# Patient Record
Sex: MALE | Race: WHITE | Employment: FULL TIME | URBAN - METROPOLITAN AREA
[De-identification: names, ages, dates, MRNs, and addresses within clinical notes are randomized per-mention and may not be internally consistent; named-entity substitution may affect disease eponyms.]

---

## 2017-06-26 PROBLEM — I49.3 PVCS (PREMATURE VENTRICULAR CONTRACTIONS): Status: ACTIVE | Noted: 2017-06-26

## 2017-06-26 PROBLEM — I07.1 MILD TRICUSPID REGURGITATION BY PRIOR ECHOCARDIOGRAM: Status: ACTIVE | Noted: 2017-06-26

## 2017-08-02 PROCEDURE — 84403 ASSAY OF TOTAL TESTOSTERONE: CPT | Performed by: FAMILY MEDICINE

## 2025-02-14 RX ORDER — ASPIRIN 81 MG/1
81 TABLET ORAL DAILY
COMMUNITY

## 2025-02-14 RX ORDER — METOPROLOL SUCCINATE 25 MG/1
25 TABLET, EXTENDED RELEASE ORAL DAILY
COMMUNITY

## 2025-02-14 NOTE — PAT NURSING NOTE
Called and spoke with pt regarding PAT call. He does not use my chart. We reviewed instructions and he wrote them down.    Will get another phone call this afternoon with ISABELA. Check in at  Reg desk at [facility]. NPO after midnoc. The morning of procedure take Metoprolol and ASA 81 mg x 4 tablets w/ SOW. Hold off on any vitamins/supplements the morning of your procedure. Shower w/ antibacterial soap before coming in. Pt's wife will be  home. Questions from pt and spouse answered. They v/u.

## 2025-02-17 ENCOUNTER — HOSPITAL ENCOUNTER (OUTPATIENT)
Dept: INTERVENTIONAL RADIOLOGY/VASCULAR | Facility: HOSPITAL | Age: 50
Discharge: HOME | End: 2025-02-17
Attending: INTERNAL MEDICINE | Admitting: INTERNAL MEDICINE
Payer: COMMERCIAL

## 2025-02-17 VITALS
SYSTOLIC BLOOD PRESSURE: 123 MMHG | DIASTOLIC BLOOD PRESSURE: 81 MMHG | BODY MASS INDEX: 24.78 KG/M2 | WEIGHT: 187 LBS | HEIGHT: 73 IN | TEMPERATURE: 97 F | OXYGEN SATURATION: 96 % | RESPIRATION RATE: 19 BRPM | HEART RATE: 65 BPM

## 2025-02-17 DIAGNOSIS — R94.31 ABNORMAL ECG DURING EXERCISE STRESS TEST: ICD-10-CM

## 2025-02-17 DIAGNOSIS — I07.1 MILD TRICUSPID REGURGITATION BY PRIOR ECHOCARDIOGRAM: ICD-10-CM

## 2025-02-17 LAB — ISTAT ACTIVATED CLOTTING TIME: 273 SECONDS (ref 74–137)

## 2025-02-17 PROCEDURE — B2111ZZ FLUOROSCOPY OF MULTIPLE CORONARY ARTERIES USING LOW OSMOLAR CONTRAST: ICD-10-PCS | Performed by: INTERNAL MEDICINE

## 2025-02-17 PROCEDURE — 99153 MOD SED SAME PHYS/QHP EA: CPT | Performed by: INTERNAL MEDICINE

## 2025-02-17 PROCEDURE — 92978 ENDOLUMINL IVUS OCT C 1ST: CPT | Performed by: INTERNAL MEDICINE

## 2025-02-17 PROCEDURE — 4A023N7 MEASUREMENT OF CARDIAC SAMPLING AND PRESSURE, LEFT HEART, PERCUTANEOUS APPROACH: ICD-10-PCS | Performed by: INTERNAL MEDICINE

## 2025-02-17 PROCEDURE — 93005 ELECTROCARDIOGRAM TRACING: CPT

## 2025-02-17 PROCEDURE — 85347 COAGULATION TIME ACTIVATED: CPT

## 2025-02-17 PROCEDURE — 027034Z DILATION OF CORONARY ARTERY, ONE ARTERY WITH DRUG-ELUTING INTRALUMINAL DEVICE, PERCUTANEOUS APPROACH: ICD-10-PCS | Performed by: INTERNAL MEDICINE

## 2025-02-17 PROCEDURE — 99152 MOD SED SAME PHYS/QHP 5/>YRS: CPT | Performed by: INTERNAL MEDICINE

## 2025-02-17 PROCEDURE — 93010 ELECTROCARDIOGRAM REPORT: CPT | Performed by: INTERNAL MEDICINE

## 2025-02-17 PROCEDURE — 93458 L HRT ARTERY/VENTRICLE ANGIO: CPT | Performed by: INTERNAL MEDICINE

## 2025-02-17 PROCEDURE — B2151ZZ FLUOROSCOPY OF LEFT HEART USING LOW OSMOLAR CONTRAST: ICD-10-PCS | Performed by: INTERNAL MEDICINE

## 2025-02-17 RX ORDER — ROSUVASTATIN CALCIUM 20 MG/1
20 TABLET, COATED ORAL NIGHTLY
Qty: 30 TABLET | Refills: 5 | Status: SHIPPED | OUTPATIENT
Start: 2025-02-17

## 2025-02-17 RX ORDER — HEPARIN SODIUM 5000 [USP'U]/ML
INJECTION, SOLUTION INTRAVENOUS; SUBCUTANEOUS
Status: COMPLETED
Start: 2025-02-17 | End: 2025-02-17

## 2025-02-17 RX ORDER — MIDAZOLAM HYDROCHLORIDE 1 MG/ML
INJECTION INTRAMUSCULAR; INTRAVENOUS
Status: COMPLETED
Start: 2025-02-17 | End: 2025-02-17

## 2025-02-17 RX ORDER — ASPIRIN 81 MG/1
81 TABLET ORAL DAILY
Status: DISCONTINUED | OUTPATIENT
Start: 2025-02-18 | End: 2025-02-17

## 2025-02-17 RX ORDER — SODIUM CHLORIDE 9 MG/ML
INJECTION, SOLUTION INTRAVENOUS CONTINUOUS
Status: DISCONTINUED | OUTPATIENT
Start: 2025-02-17 | End: 2025-02-17

## 2025-02-17 RX ORDER — NITROGLYCERIN 20 MG/100ML
INJECTION INTRAVENOUS
Status: COMPLETED
Start: 2025-02-17 | End: 2025-02-17

## 2025-02-17 RX ORDER — LIDOCAINE HYDROCHLORIDE 10 MG/ML
INJECTION, SOLUTION EPIDURAL; INFILTRATION; INTRACAUDAL; PERINEURAL
Status: COMPLETED
Start: 2025-02-17 | End: 2025-02-17

## 2025-02-17 RX ORDER — ROSUVASTATIN CALCIUM 20 MG/1
20 TABLET, COATED ORAL NIGHTLY
Qty: 30 TABLET | Refills: 5 | Status: SHIPPED | OUTPATIENT
Start: 2025-02-17 | End: 2025-02-17

## 2025-02-17 RX ORDER — VERAPAMIL HYDROCHLORIDE 2.5 MG/ML
INJECTION, SOLUTION INTRAVENOUS
Status: COMPLETED
Start: 2025-02-17 | End: 2025-02-17

## 2025-02-17 NOTE — DISCHARGE INSTRUCTIONS
HOME CARE INSTRUCTIONS FOLLOWING CORONARY ANGIOGRAPHY,  PERIPHERAL ANGIOGRAPHY, ANGIOPLASTY (PTCA/PTA) OR INSERTION  OF STENT IN THE CORONARY, CAROTID, AND/OR PERIPHERAL ARTERIES      Activity:   DO NOT drive after the procedure. You may resume driving late the following day according to the nurse or physician’s instructions   Plan on resting and relaxing tonight and tomorrow   Resume your normal activity after 48 hours, or as instructed by your physician   Do not lift anything over 10 pounds for the next 24 hours   Avoid sexual activity for the next 24 hours   Avoid drinking alcohol for the next 24 hours   If the wrist was used, avoid bending/flexing of the wrist for the next 24 hours.       What is Normal?   A small lump at the procedure site associated with mild tenderness when touched   The procedure site may be bruised or discolored   There may be a small amount of drainage on the bandage    Special Instructions:   Drink plenty of fluids during the next 24 hours to “flush” the contrast from your system   The bandage is to remain in place for 24 hours   Keep the bandage clean and dry   DO NOT submerge the procedure site for 72 hours (no bath tubs or pools). This includes dishwashing/submersion of the wrist, if the wrist was used   After 24 hours, you must remove the bandage   You should shower after removing the bandage, and wash the procedure site gently with soap and water   If you choose to wear a bandage for a few days, make sure it remains clean and dry and that it is changed daily    When you should NOTIFY YOUR PHYSICIAN:   Bleeding can occur at the procedure site - both on the outside of the skin and/or beneath the surface of the skin   Swelling or a large lump at the procedure site can occur, which may be accompanied by moderate to severe pain. If either of the above occurs, lie down flat. Have someone apply pressure to the procedure site with both hands, as instructed by the nurse. Hold pressure for 20  minutes and the bleeding should stop. Notify your physician of the occurrence. If the bleeding does not stop, call 911 and continue to apply pressure   If you experience signs of a fever, temperature >101 degrees, chills, infection (redness, swelling, thick yellow drainage, or a foul odor from the procedure site)   If you notice any numbness, tingling, or loss of feeling to your fingers or hand, if wrist access was utilized    If you Received a Stent:  - You will remain on an antiplatelet drug and/or aspirin. Antiplatelet medications are usually taken for six months to one year and should not be stopped unless your cardiologist directs you to do so. These medications help to prevent blockage at the stent site. If another physician or dentist asks you to stop your antiplatelet medication, you need to consult your cardiologist first. Together, your cardiologist and other physician can discuss the risks that may be involved if you are not taking the antiplatelet medication.   - If a MRI is necessary, it may be done 4-6 weeks after your procedure. Verify this with your cardiologist.  - Keep the stent card with you at all times! If you need a MRI in the future, your stent card will need to be shown to the technologist before performing the MRI. A duplicate card CANNOT be reproduced.     Other:  - You may resume your present diet, unless otherwise specified by your physician.   - You may resume all of your medications as prescribed, unless otherwise directed by your physician. A list of your medications was provided to you at discharge.  - Do not make any personal/business decisions and/or sign any legal documents for the next 24 hours.

## 2025-02-17 NOTE — H&P
[facility] Cardiology  History and Physical      Daniel Banegas Patient Status:  Outpatient    7/10/1975 MRN [id number]   [facility] INTERVENTIONAL SUITES Attending Eliud Wei MD   Hosp Day # 0 PCP Jimmy Gorman DO     Outpatient cardiologist:  Giorgio Wei    Impression/Plan:  exertional chest pain, abnormal stress test- 1-2mm horizontal ST depression  LHC +/- PCI, radial approach    The risks, benefits, and alternatives of cardiac catheterization were discussed. The risks included, but were not limited to: bleeding, allergic reaction, infection, stroke, myocardial infarction (heart attack), and death. Benefits of the procedure included: symptomatic improvement, diagnosis of heart disease and prevention of myocardial infarction. Alternatives to the procedure included: not performing cardiac catheterization, treatment with medications only, and observation.         History of Present Illness:  Daniel Banegas is a 49 year old male here for coronary angiography procedure.Pt of Dr. Wei, +mild HL.  Recently endorsed 3wk exertional chest pain. GXT performed, 10 min, 5/10 chest pain, 1-2 mm horizontal ST depression.  Here for definitive coronary angiography.     Medications:  No current facility-administered medications for this encounter.       Past Medical History:    Chronic neck pain    Coronary atherosclerosis    Fear of public speaking       Past Surgical History:   Procedure Laterality Date    Special service or report      lasik procedure     Tonsillectomy         Family History  family history includes Cancer in his maternal grandmother; Heart Disorder in his paternal grandfather; Other in his mother.    Social History   reports that he has quit smoking. His smoking use included cigarettes. He has never used smokeless tobacco. He reports current alcohol use. He reports that he does not use drugs.     Allergies  Allergies[1]      Review of Systems:  Constitutional:  negative for fevers  Eyes: negative for visual disturbance  Ears, nose, mouth, throat, and face: negative for epistaxis  Respiratory: negative for dyspnea on exertion  Cardiovascular: negative for chest pain  Gastrointestinal: negative for melena  Genitourinary:negative for hematuria  Hematologic/lymphatic: negative for bleeding  Musculoskeletal:negative for myalgias  Neurological: negative for dizziness and headaches  Endocrine: negative for temperature intolerance      Physical Exam:  Blood pressure 131/90, pulse 65, temperature 97 °F (36.1 °C), temperature source Temporal, resp. rate 13, height 6' 1\" (1.854 m), weight 187 lb (84.8 kg), SpO2 97%.  Temp (24hrs), Av °F (36.1 °C), Min:97 °F (36.1 °C), Max:97 °F (36.1 °C)    Wt Readings from Last 3 Encounters:   25 187 lb (84.8 kg)   21 174 lb (78.9 kg)   21 175 lb (79.4 kg)       General: Awake and alert; in no acute distress  HEENT: Extraocular movements are intact; sclerae are anicteric; scalp is atrauamatic; no thyromegaly  Neck: Supple; no JVD; no carotid bruits  Cardiac: Regular rate and regular rhythm; no murmurs/rubs/gallops are appreciated; PMI is non-displaced; there is no evidence of a sternal heave  Lungs: Clear to auscultation bilaterally; no accessory muscle use is noted  Abdomen: Soft, non-tender; bowel sounds are normoactive; no hepatosplenomegaly  Extremities: No clubbing or cyanosis; moves all 4 extremities normally  Psychiatric: Normal mood and affect; answers questions appropriately  Dermatologic: No rashes; normal skin turgor    Labs:   No results found for: \"PT\", \"INR\"            Chao Cabrera MD  2025  1:35 PM         [1] No Known Allergies

## 2025-02-17 NOTE — DIETARY NOTE
Clinical Nutrition    Dietitian consult received per cardiac rehab standing order. Pt to be educated by cardiac rehab staff and encouraged to attend outpatient classes taught by RD. RD available PRN.    Jessica Connor MS, RD, LDN  Clinical Dietitian  Ext: [phone]

## 2025-02-17 NOTE — PROCEDURES
[facility]       Daniel Banegas Location: Cath Lab    CSN [id number] MRN [id number]   Admission Date 2/17/2025 Procedure Date 2/17/2025   Attending Physician Eliud Wei MD Procedure Physician Chao Cabrera MD         CARDIAC CATHETERIZATION/PERCUTANEOUS CORONARY INTERVENTION REPORT     PREOPERATIVE DIAGNOSIS:  exertional chest pain, abnormal treadmill stress- 2mm ST depression  POSTOPERATIVE DIAGNOSIS:  obstructive proximal LAD disease.  PROCEDURE PERFORMED:  left heart catheterization, left ventriculogram, selective coronary angiography, IVUS guided PCI to the LAD      PROCEDURE:  The patient was brought to the cardiac catheterization lab in the fasting state.  Informed consent was obtained.  Moderate sedation was employed using a total of IV Versed 6mg and IV fentanyl 100mcg.  I directly observed the patient from 1455 to 1536, for a total of 41 minutes, and an independent trained observer was present and assisted in the monitoring of the patient's level of consciousness and physiological status, watching the heart rate, blood pressure, oximetry, and rhythm, in addition to total moderation time.      ACCESS/CATHETER PLACEMENT:   The right radial area was prepped and draped in a sterile manner and anesthetized with 2% lidocaine.  The right radial artery was accessed, and a 6-Bahraini, 11 cm sheath was placed.  Left and right selective coronary angiography was performed using a 6F Tiger4.0 catheter.  A pigtail catheter was used to cross the aortic valve, measure left ventricular pressure and perform a 30 degree HANKS ventriculogram.  The catheter was pulled across the valve to assess for aortic stenosis.  At the conclusion of the study, the right radial artery hemostasis was performed using a radial band.         FINDINGS:      1.  Left heart catheterization:    Left ventricle: 88/9 mmHg  Aorta: 88/46/61 mmHg  Left ventriculogram demonstrated a LV ejection fraction of 60-65% without significant mitral  regurgitation; there are no regional wall motion abnormalities.  There was no aortic stenosis upon pullback of the catheter.    2.  Selective coronary angiography:      Left main artery: The left main artery is a medium size, trifurcating vessel without significant angiographic disease.      LAD:  The left anterior descending artery is a medium size vesel that wraps around the apex.  The proximal LAD demonstrates a tight 95% tubular stenosis.  Beyond it, there is mild diffuse disease.  Distally, the vessel is free of significant angiographic disease.    LCx: The left circumflex artery is a medium size vessel that gives rise to a mid obtuse marginal.  The ramus is medium size/branching vessel.  No significant angiographic disease present.      RCA:  The right coronary artery is a medium size, dominant vessel that gives rise to a small rPDA. No significant angiographic disease.     INTERVENTIONAL PROCEDURE: Heparin was used for systemic anticoagulation, confirmed therapeutic by ACT measurement.  Ticagrelor 180mg wa given.   The LM artery was engaged with a 6F XB3.5 guide catheter.  A Runthrough wire was advanced to the distal LAD.  The lesion was pre-dilated with a 2.0mm balloon, followed by deployment of a 3.0x15mm [place] OPAL, post-dilated with a 3.5mm NC balloon.  The stent was well apposed/fully expanded by IVUS ([place] [employer]), but the vessel expands in size significant at the proximal edge.  Thus, the stent was further post-dilated with a short 4.0mm NC balloon proximally and the stent appeared better apposed and expanded in the proximal vessel.  Completion angiography demonstrated a good result without residual stenosis, TIMI3 distal flow without vessel dissection/perforation.  The procedure was tolerated well.      MEDICATIONS:  See nursing record.     COMPLICATIONS:  No major complications were observed during this visit to the catheterization lab.     IMPRESSION:    1.  Left heart  catheterization: LVEDP 9mmHg, no aortic stenosis.  LVBEF 60% with normal wall motion, no mitral regurgitation  2.  Coronary angiography:  right dominant system   - LM: no significant angiographic disease  - LAD: 95% proximal stenosis  - LCx: no significant angiographic disease  - RCA: no significant angiographic disease  3. Percutaneous coronary intervention:  Successful IVUS guided PCI to the proximal LAD with a 3.0x15mm [place] OPAL, post-dilated proximally to 4.0mm.       RECOMMENDATIONS: DAPT (asa/ticagrelor) x 3-6 months minimum, uninterrupted.

## 2025-02-18 LAB
ATRIAL RATE: 56 BPM
P AXIS: 37 DEGREES
P-R INTERVAL: 144 MS
Q-T INTERVAL: 422 MS
QRS DURATION: 100 MS
QTC CALCULATION (BEZET): 407 MS
R AXIS: 92 DEGREES
T AXIS: 55 DEGREES
VENTRICULAR RATE: 56 BPM

## 2025-02-18 NOTE — PROGRESS NOTES
Rc'd pt from cath lab s/p PCI to LAD in stable condition. VSS. VascBand to right wrist in place with 7 mls of air. Site is soft, clean and dry. No bleeding or hematoma. Good pulse and pleth waveform. Pt denies c/o pain or discomfort. Dr Cabrera at bedside along with pts family. 12L EKG done. Unable to leave a  with cardiac rehb, I will call them tomorrow. Meds to beds delivered meds to pt.    18:30: After an hour, air was slowly removed from VascBand and a pressure dressing was applied. Site is stable. Manual pressure and a quickclot was applied for a few mins. IV removed with catheter intact. Reviewed discharge instructions with pt and family, verbalizes understanding. Voided. Home via w/c in stable condition without c/o.